# Patient Record
Sex: FEMALE | Race: WHITE | NOT HISPANIC OR LATINO | URBAN - NONMETROPOLITAN AREA
[De-identification: names, ages, dates, MRNs, and addresses within clinical notes are randomized per-mention and may not be internally consistent; named-entity substitution may affect disease eponyms.]

---

## 2019-03-06 ENCOUNTER — IMPORTED ENCOUNTER (OUTPATIENT)
Dept: URBAN - NONMETROPOLITAN AREA CLINIC 1 | Facility: CLINIC | Age: 74
End: 2019-03-06

## 2019-03-06 PROBLEM — H52.4: Noted: 2019-03-06

## 2019-03-06 PROBLEM — H52.223: Noted: 2019-03-06

## 2019-03-06 PROBLEM — H52.03: Noted: 2019-03-06

## 2019-03-06 PROBLEM — Z96.1: Noted: 2019-03-15

## 2019-03-06 PROBLEM — H26.493: Noted: 2019-03-15

## 2019-03-06 PROBLEM — H26.493: Noted: 2019-03-06

## 2019-03-06 PROBLEM — Z96.1: Noted: 2019-03-06

## 2019-03-06 PROCEDURE — 92004 COMPRE OPH EXAM NEW PT 1/>: CPT

## 2019-03-06 PROCEDURE — 92015 DETERMINE REFRACTIVE STATE: CPT

## 2019-03-06 NOTE — PATIENT DISCUSSION
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

## 2019-03-15 ENCOUNTER — IMPORTED ENCOUNTER (OUTPATIENT)
Dept: URBAN - NONMETROPOLITAN AREA CLINIC 1 | Facility: CLINIC | Age: 74
End: 2019-03-15

## 2019-03-15 PROBLEM — Z96.1: Noted: 2019-03-15

## 2019-03-15 PROCEDURE — 92014 COMPRE OPH EXAM EST PT 1/>: CPT

## 2019-03-15 PROCEDURE — 66821 AFTER CATARACT LASER SURGERY: CPT

## 2019-03-15 NOTE — PATIENT DISCUSSION
PCO-Explained PCO and RBAs of YAG Capsulotomy to pt. -Pt elects to proceed.  YAG Caps OD today and YAG Caps OS in 1-2 weeks.; 's Notes: MR 3/6/2019DFE 3/6/2019

## 2019-04-05 ENCOUNTER — IMPORTED ENCOUNTER (OUTPATIENT)
Dept: URBAN - NONMETROPOLITAN AREA CLINIC 1 | Facility: CLINIC | Age: 74
End: 2019-04-05

## 2019-04-05 PROCEDURE — 66821 AFTER CATARACT LASER SURGERY: CPT

## 2019-04-05 NOTE — PATIENT DISCUSSION
PCO-Explained PCO and RBAs of YAG Capsulotomy to pt. -Pt elects to proceed.  -YAG CAPS performed today OS 4/5/19 by GS -Consent signed; 's Notes: MR 3/6/2019DFE 3/6/2019

## 2019-04-08 PROBLEM — H52.223: Noted: 2019-03-06

## 2019-04-08 PROBLEM — H52.4: Noted: 2019-03-06

## 2019-04-08 PROBLEM — Z96.1: Noted: 2019-03-15

## 2019-04-08 PROBLEM — H52.03: Noted: 2019-03-06

## 2019-04-08 PROBLEM — H26.492: Noted: 2019-04-08

## 2019-04-16 ENCOUNTER — IMPORTED ENCOUNTER (OUTPATIENT)
Dept: URBAN - NONMETROPOLITAN AREA CLINIC 1 | Facility: CLINIC | Age: 74
End: 2019-04-16

## 2019-04-16 PROCEDURE — 99024 POSTOP FOLLOW-UP VISIT: CPT

## 2019-04-16 NOTE — PATIENT DISCUSSION
s/p YAG PC OU-  discussed findings w/patient-  doing very well at this time both eyes-  continue to monitor yearly or prn; 's Notes: MR 3/6/2019DFE 3/6/2019

## 2022-04-10 ASSESSMENT — VISUAL ACUITY
OS_GLARE: 20/100
OD_CC: 20/200
OD_CC: 20/40
OD_CC: 20/30-1
OS_SC: J3
OD_PH: 20/100
OS_PH: 20/20
OD_CC: 20/200
OD_SC: J4
OS_GLARE: 20/60
OS_CC: 20/25
OU_CC: 20/20
OS_CC: 20/30
OD_GLARE: 20/400
OU_SC: J2
OS_CC: 20/25
OS_CC: 20/25

## 2022-04-10 ASSESSMENT — TONOMETRY
OS_IOP_MMHG: 12
OS_IOP_MMHG: 17
OD_IOP_MMHG: 17
OS_IOP_MMHG: 18
OS_IOP_MMHG: 17
OD_IOP_MMHG: 12

## 2023-10-20 ENCOUNTER — NEW PATIENT (OUTPATIENT)
Dept: URBAN - NONMETROPOLITAN AREA CLINIC 4 | Facility: CLINIC | Age: 78
End: 2023-10-20

## 2023-10-20 DIAGNOSIS — H52.223: ICD-10-CM

## 2023-10-20 DIAGNOSIS — H43.813: ICD-10-CM

## 2023-10-20 DIAGNOSIS — Z96.1: ICD-10-CM

## 2023-10-20 DIAGNOSIS — H52.4: ICD-10-CM

## 2023-10-20 PROCEDURE — 92015 DETERMINE REFRACTIVE STATE: CPT

## 2023-10-20 PROCEDURE — 92004 COMPRE OPH EXAM NEW PT 1/>: CPT

## 2023-10-20 ASSESSMENT — TONOMETRY
OS_IOP_MMHG: 18
OD_IOP_MMHG: 17

## 2023-10-20 ASSESSMENT — VISUAL ACUITY
OD_SC: 20/60
OS_SC: 20/30